# Patient Record
Sex: FEMALE | Race: BLACK OR AFRICAN AMERICAN | URBAN - METROPOLITAN AREA
[De-identification: names, ages, dates, MRNs, and addresses within clinical notes are randomized per-mention and may not be internally consistent; named-entity substitution may affect disease eponyms.]

---

## 2017-02-21 ENCOUNTER — ONCOLOGY VISIT (OUTPATIENT)
Dept: ONCOLOGY | Facility: CLINIC | Age: 37
End: 2017-02-21
Attending: INTERNAL MEDICINE
Payer: COMMERCIAL

## 2017-02-21 VITALS
RESPIRATION RATE: 16 BRPM | DIASTOLIC BLOOD PRESSURE: 86 MMHG | HEART RATE: 96 BPM | OXYGEN SATURATION: 99 % | SYSTOLIC BLOOD PRESSURE: 123 MMHG | TEMPERATURE: 96.7 F | WEIGHT: 137.5 LBS | BODY MASS INDEX: 24.75 KG/M2

## 2017-02-21 DIAGNOSIS — D89.42 IDIOPATHIC MAST CELL ACTIVATION SYNDROME (H): Primary | Chronic | ICD-10-CM

## 2017-02-21 PROCEDURE — 99214 OFFICE O/P EST MOD 30 MIN: CPT | Mod: ZP | Performed by: INTERNAL MEDICINE

## 2017-02-21 PROCEDURE — 99212 OFFICE O/P EST SF 10 MIN: CPT | Mod: ZF

## 2017-02-21 ASSESSMENT — PAIN SCALES - GENERAL: PAINLEVEL: NO PAIN (0)

## 2017-02-21 NOTE — NURSING NOTE
"Yanira Cook is a 36 year old female who presents for:  Chief Complaint   Patient presents with     Oncology Clinic Visit     Return: Mast Cell Eval         Initial Vitals:  /86 (BP Location: Left arm, Patient Position: Chair, Cuff Size: Adult Regular)  Pulse 96  Temp 96.7  F (35.9  C) (Oral)  Resp 16  Wt 62.4 kg (137 lb 8 oz)  SpO2 99%  BMI 24.75 kg/m2 Estimated body mass index is 24.75 kg/(m^2) as calculated from the following:    Height as of 8/16/16: 1.588 m (5' 2.5\").    Weight as of this encounter: 62.4 kg (137 lb 8 oz).. Body surface area is 1.66 meters squared. BP completed using cuff size: regular  No Pain (0) No LMP recorded. Patient is not currently having periods (Reason: UNKNOWN). Allergies and medications reviewed.     Medications: Medication refills not needed today.  Pharmacy name entered into [employer]: [id number] - [place], [place] - [address] AT [facility] OF [facility] [address]    Comments:     6 minutes for nursing intake (face to face time)   Eliana Ayoub CMA        "

## 2017-02-21 NOTE — MR AVS SNAPSHOT
After Visit Summary   2/21/2017    Yanira Cook    MRN: [id number]           Patient Information     Date Of Birth          1980        Visit Information        Provider Department      2/21/2017 4:00 PM Miguel Broussard MD [facility]        Today's Diagnoses     Idiopathic mast cell activation syndrome    -  1       Follow-ups after your visit        Follow-up notes from your care team     Return in about 1 year (around 2/21/2018).      Who to contact     If you have questions or need follow up information about today's clinic visit or your schedule please contact [facility] directly at [phone].  Normal or non-critical lab and imaging results will be communicated to you by MyChart, letter or phone within 4 business days after the clinic has received the results. If you do not hear from us within 7 days, please contact the clinic through [employer] or phone. If you have a critical or abnormal lab result, we will notify you by phone as soon as possible.  Submit refill requests through [employer] or call your pharmacy and they will forward the refill request to us. Please allow 3 business days for your refill to be completed.          Additional Information About Your Visit        MyChart Information     [employer] gives you secure access to your electronic health record. If you see a primary care provider, you can also send messages to your care team and make appointments. If you have questions, please call your primary care clinic.  If you do not have a primary care provider, please call [phone] and they will assist you.        Care EveryWhere ID     This is your Care EveryWhere ID. This could be used by other organizations to access your [facility] medical records  [id number]        Your Vitals Were     Pulse Temperature Respirations Pulse Oximetry BMI (Body Mass Index)       96 96.7  F (35.9  C) (Oral) 16 99% 24.75 kg/m2        Blood Pressure  from Last 3 Encounters:   02/21/17 123/86   08/16/16 (!) 130/93   11/24/15 134/89    Weight from Last 3 Encounters:   02/21/17 62.4 kg (137 lb 8 oz)   08/16/16 64.2 kg (141 lb 8 oz)   11/24/15 64.6 kg (142 lb 6.4 oz)              Today, you had the following     No orders found for display         Today's Medication Changes          These changes are accurate as of: 2/21/17  9:04 PM.  If you have any questions, ask your nurse or doctor.               Stop taking these medicines if you haven't already. Please contact your care team if you have questions.     Acetylcysteine Powd   Stopped by:  Miguel Broussard MD                    Primary Care Provider Office Phone # Fax #    Etienne Pineda [phone]       [facility] [address] [facility] [address]        Thank you!     Thank you for choosing [facility]  for your care. Our goal is always to provide you with excellent care. Hearing back from our patients is one way we can continue to improve our services. Please take a few minutes to complete the written survey that you may receive in the mail after your visit with us. Thank you!             Your Updated Medication List - Protect others around you: Learn how to safely use, store and throw away your medicines at www.disposemymeds.org.          This list is accurate as of: 2/21/17  9:04 PM.  Always use your most recent med list.                   Brand Name Dispense Instructions for use    cetirizine 10 MG tablet    zyrTEC     Take 10 mg by mouth daily       clobetasol 0.05 % cream    TEMOVATE     Apply topically 2 times daily       famotidine 40 MG/5ML suspension    PEPCID     Take 20 mg by mouth 2 times daily       fluocinonide-emollient 0.05 % cream    LIDEX-E     Apply topically 2 times daily       hydrocortisone 1 % ointment      Apply topically as needed       MULTIVITAMINS PO      Take 5 mLs by mouth       PATANOL 0.1 % ophthalmic solution   Generic drug:   olopatadine      1 drop 2 times daily       UNABLE TO FIND      MEDICATION NAME: BACOPA 200 MG DAILY AS DIRECTED       vitamin D3 2000 UNITS Caps      Take 1 capsule by mouth daily 5,000 units

## 2017-02-21 NOTE — LETTER
"2/21/2017       RE: Yanira Cook  [address] [id number]     Dear Colleague,    Thank you for referring your patient, Yanira Cook, to the [facility]. Please see a copy of my visit note below.    Patient: Yanira Cook (MRN [id number])   Encounter Date: Feb 21, 2017  Referring: Martin Pineda M.D. ([facility], [address] [place] [address], [phone], fax [phone])  Attending: Miguel Broussard M.D.     Current Diagnosis: Based on (1) a clinical history highly consistent with chronic/recurrent aberrant mast cell  release, (2) multiple strongly elevated mast cell  levels (an elevated 24-hour urinary prostaglandin D2 in 2/15 (401 ng/24h, normal 100-280), and in 5/15 another elevated 24-hour urinary prostaglandin D2 (959 ng/24h, normal 100-280) and an elevated 24-hour urinary 11-beta-prostaglandin-F2-alpha (2575 ng/24h, normal <= 1000)) (but a normal serum tryptase), (3) absence of any other evident disease that could better account for the full range and chronicity of the symptoms and findings in the case, and (4) at least partial response to mast-cell-targeted therapy, mast cell activation syndrome (MCAS; 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 Cook's essentially lifelong complex of multisystem polymorbidity of a generally inflammatory +/- allergic theme including being informed by her parents that she was born with some sort of a blood infection, and early in life she was noted to frequently suffer ear infections (requiring several myringotomies with tubes) and kidney/bladder infections.  She says she was hospitalized many times in childhood for infection.  She says anemia was diagnosed around the time of menarche at age 9, and she has \"always\" (since menarche) been menorrhagic, which she's always been told is " "the reason for the chronic fatigue that also seemed to first emerge around age 9.  She says she suffered a terrible bout of mononucleosis at age 12 that had her out of school for many months, though she eventually returned to her baseline anemic, chronically fatigued state.  Iron supplements and other unknown interventions were tried for her anemia, but nothing appeared to help.  Given that she is the product of a [place] mother and a [place] father, she says she was tested in some fashion for thalassemia of some sort, but this was found negative.  IV iron was tried at age 16, but it immediately caused dyspnea.  She says she was switched to Venofer and says she did \"OK\" with this, but it only made her anemia and fatigue \"a little bit better\" for about six months.  Sinus surgery of some sort was then pursued for her chronic sinusitis, and this helped for about a year, after which a new issue with chronic/recurrent pharyngitis emerged, leading to a tonsillectomy and adenoidectomy at age 18 that resolved the pharyngitis, but then the sinusitis began recurring again.  (Interestingly, she also says she suffered a cardiac arrest in response to being given Demerol post-op from the [facility].)  Her first pregnancy, at age 19, unfortunately resulted in a live birth with achondrogenesis who  shortly post-partum.  She almost immediately got pregnant (G2) again, gained a severe amount of weight (~50 pounds), and had to be induced at 44 weeks.  She says this is when her subsequently life-long whitfield with eczema began.  Repeat sinus surgery was done at age 19.  She also began noting her hands and feet were always cold and that this was attributed to varicose veins, for which she underwent a stripping procedure even though she was only 21 years old.  The stripping did not help.  Her third pregnancy came at age 21, and she suffered what sounds like hyperemesis gravidarum throughout, delivering at term a boy who initially " "appeared healthy but who now suffers \"idiopathic hypersomnia\" and narcolepsy.  She was also having increasing problems with abdominal pain and underwent at age 21 a cholecystectomy, though a ventral hernia developed from this which had to be repaired in another surgery at age 22, at which point she says she suffered another cardiac arrest, this time from exposure to codeine.  Interestingly, she says that shortly after this repair, she developed \"pigment spots all over,\" though they were non-urticarial.  She says this was diagnosed as Schamberg's disease and spontaneously resolved after about six months.  Coccygitis had emerged by that point and she got cortisone shots for a short period helped.  Meanwhile, she had never lost the weight from the G2 pregnancy, so around age 23 she underwent gastric bypass surgery, losing from ~200 pounds down to ~130 pounds.  She tolerated the surgery OK.  Her fourth and final pregnancy came at age 24, again suffering hyperemesis gravidarum for the duration, to the point of requiring placement of a PICC line with total parenteral nutrition, and she also suffered multiple infections throughout this pregnancy including pertussis and Staph, and as if all of that wasn't enough of an adventure in pregnancy, she also suffered a DVT.  She was induced (\"salvador breech,\" she says) at 34 weeks because it wasn't clear she (let alone the fetus) would survive to term.  Her infant son unfortunately was found to have chondrodysplasia punctata, and though she initially told me he's \"doing OK now,\" it later comes out that \"OK\" means he's chronically on a ventilator.  She recalls she hemorrhaged during this delivery with her hemoglobin nadiring at 2.  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  Interesting, confirmatory " "genetic testing was pursued and was found completely negative.  She began taking prednisone intermittent for her migraines and found this helped for about two years.  At age 27 a new problem with recurrent severe presyncope (and even occasional salvador syncope) emerged.  These idiopathic/spontaneous episodes were accompanied by soaking sweats and flushing.  She failed a tilt-table test and was diagnosed with POTS, but trials of beta blockers, Florinef, and midodrine never helped, and then yet another problem emerged, intermittent severe dystonia, initially just affecting her right arm but over months coming to affect her entire body.  This was diagnosed as a conversion disorder.  She found that when she changed to a different workplace, the frequency and severity of this problem decreased somewhat.  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  Imaging of the cervical spine found some compression in C2-6.  She underwent manual manipulations of her neck for several months, eventually regaining most of the motor and sensory function initially lost in the right arm and leg, but these areas have never come back fully to their prior strength/sensitivity.  Cervical spine cortisone shots have helped, too.  She notes that around age 32 she suffered an acute flare of most of her symptoms (including dystonia) while vacationing in [place].  No diagnosis was made but Ativan helped resolve the dystonia and she returned home (here in [place]).  She has continued to suffer intermittent migraines (prednisone helps), and she feels her bilateral pre- and post-auricular sy areas are constantly waxing/waning in size -- and are constantly tender.  On " "a full ROS at the initial visit here in 2/15, she endorsed a wide range of chronic/recurrent, episodic/intermittent and/or waxing/waning issues including feeling cold much of the time, fatigue (often to the point of exhaustion), malaise, flushing, migraine headaches, diffusely migratory aching/pain, diffusely migratory pruritus, chronic left temple irritation/inflammation (non-specific inflammation on biopsy, not granuloma annulare or inguinale), unprovoked soaking sweats, unprovoked fluctuations in weight and appetite, irritation of the eyes, easy bruising, constant coryza and congestion, mild sensorineural hearing deficit (worse on the right), mouth sores, dyspnea, palpitations, gastroesophageal reflux, nausea, vomiting, diarrhea alternating with constipation (much more so the former), diffusely migratory abdominal discomfort including (usually post-prandial) bloating, urinary frequency, mild chronic/residual right-sided weakness and other episodes of diffusely migratory weakness, edema in the hands and feet on waking in the morning, diffusely migratory tingling/numbness in the hands and feet and random points elsewhere, pre- and post-auricular tender adenopathy (waxing/waning spontaneously), orthostatic and non-orthostatic presyncope, syncope, cognitive dysfunction, hair loss, deterioration of dentition despite good attention to dental hygiene, brittle ridged nails, and poor healing in general.  In the year prior to her initial presentation here in 2/15, she found meditation and a change in diet to \"all organics\" has helped many of her symptoms.  Family history is notable for myasthenia gravis in her father, an older son who is \"allergic to everything\" (and who has found Xolair q 2 weeks really helps this) and has a sleep disorder and G6PD deficiency.  Her younger son, with chondrodysplasia punctata, also has sleep apnea but has found a trach very helpful, and he, too, has G6PD deficiency.  Her daughter is now " "beginning to manifest easy/spontaneous bruising and idiopathic epistaxis and anemia, the oddity of all of which prompted the patient (who had long since given up trying to find an explanation for her many mysteries) to re-attempt finding a unifying diagnosis for her own problems, and this led her to her primary physician and her local hematologist (juan c Serrano (sp.?)?), who she says suggested this might be a mast cell activation syndrome, leading to the present consult.  The patient denies any history of smoking or illegal substance use, alcohol causes her to acutely break out in soaking sweats, soon followed by vomiting.    Current Therapy: She lists her current medications as cetirizine 10 mg bid, famotidine 20 mg bid, vitamin D 5,000 units qd, steroid creams/ointments prn, olopatadine eyedrops bid, probiotic, prednisone prn, acetylcysteine 1 g po qd, and Bacopa 200 mg/d.  She lists her current allergies as anaphylaxis to iron dextran and cardiac arrest with codeine and Demerol.    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-up curiously telling me that she has been feeling well (in contrast to her last report, in which \"allergies\" had resurfaced and she was constantly sniffling throughout the encounter and had a chief complaint of a constant headache since previously stopping vitamin D supplementation) and is very happy with how much she has improved since she first met me and has no complaints (and also mentions she is moving to [place] in late ) -- and yet she also tells me that she doesn't think the vitamin D she restarted, or the increase in cetirizine from 10 mg qd to 10 mg bid in this last interval, have helped her at all.  She denies any new problems.  She reports her son has continued doing much better with his obstructive sleep apnea since undergoing a tracheotomy (CPAP at 20 cm of pressure proved insufficient).  She remains very happy with " "the care being provided her children by Dr. Grande.    Exam finds an obviously improved, happy, outwardly healthy-appearing, pleasant/upbeat, trim mixed-race young woman looking her stated age, in no apparent distress (the prior sniffling is completely resolved), pleasant, cooperative, fully alert and oriented, easily independently ambulatory, presenting with her young tracheotomized son who again is very well behaved throughout the encounter.  Vitals per chart, notable only for an unchanged borderline hypertensive BP at 123/86, pulse 96, weight down 4 since 8/16 to 138 pounds.  [place] findings are her healthy, comfortable general appearance, HEENT benign but for a large number of (unchanged) dental fillings, no plethora/pallor/diaphoresis/jaundice/bleeding/bruising, prior rash now appears resolved, neck supple, no JVD or thyromegaly or carotid bruits, no palpable adenopathy or tenderness at any of the usual node-bearing sites, clear lungs, regular heart with no adventitious sounds, abdomen benign with many well-healed scars and striae (though I think the striae actually have significantly reduced since I last saw them), no peripheral edema, neuro grossly intact.  On a light scratch test on the upper back performed at the initial visit here in 2/15, moderately bright dermatographism (erythroderma only, no hives) almost immediately emerged and was fully sustained when last checked 10 minutes later, but we did not recheck this today.    No new labs today.    A/P: Underlying/unifying diagnosis (MCAS) as detailed above, obviously doing much better today than when I last saw her, but I suspect the improvements from the escalated cetirizine and restarted vitamin D manifested so slowly that it was difficult for her to fully appreciate them.  Since she feels these two medication changes haven't helped her at all, I invited her to \"undo\" them, albeit taking care to make just one change at a time and allow a month after each " "change (stopping the vitamin D, and then decreasing cetirizine to just once daily) to determine whether she's still doing as well (and, if not, to \"undo the undo\").  Finally, she asked for recommendations regarding ongoing sensitivities to odors (such as when shopping), and I recommended she consider a trial of (OTC) Nasalcrom or a nasal H1 antihistamine (e.g., Patanase, Clarispray), and after relocation she can also discuss with her new doctor the possibility of a trial of nebulized cromolyn (20 mg bid-qid).    She'll tentatively return in a year (no labs).      Typed, reviewed, and electronically signed by: Miguel Broussard M.D.     DT: 08/16/2016 10:24 PM    cc: Martin Pineda M.D. ([facility], [address] [place] [address], [phone], fax [phone])    Again, thank you for allowing me to participate in the care of your patient.      Sincerely,    Miguel Broussard MD      "

## 2017-02-21 NOTE — PROGRESS NOTES
"Patient: Yanira Cook (MRN [id number])   Encounter Date: Feb 21, 2017  Referring: Martin Pineda M.D. ([facility], [facility], [address] [place] [address], [phone], fax [phone])  Attending: Miguel Broussard M.D.     Current Diagnosis: Based on (1) a clinical history highly consistent with chronic/recurrent aberrant mast cell  release, (2) multiple strongly elevated mast cell  levels (an elevated 24-hour urinary prostaglandin D2 in 2/15 (401 ng/24h, normal 100-280), and in 5/15 another elevated 24-hour urinary prostaglandin D2 (959 ng/24h, normal 100-280) and an elevated 24-hour urinary 11-beta-prostaglandin-F2-alpha (2575 ng/24h, normal <= 1000)) (but a normal serum tryptase), (3) absence of any other evident disease that could better account for the full range and chronicity of the symptoms and findings in the case, and (4) at least partial response to mast-cell-targeted therapy, mast cell activation syndrome (MCAS; 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 Cook's essentially lifelong complex of multisystem polymorbidity of a generally inflammatory +/- allergic theme including being informed by her parents that she was born with some sort of a blood infection, and early in life she was noted to frequently suffer ear infections (requiring several myringotomies with tubes) and kidney/bladder infections.  She says she was hospitalized many times in childhood for infection.  She says anemia was diagnosed around the time of menarche at age 9, and she has \"always\" (since menarche) been menorrhagic, which she's always been told is the reason for the chronic fatigue that also seemed to first emerge around age 9.  She says she suffered a terrible bout of mononucleosis at age 12 that had her out of school for many months, though she eventually returned to her baseline anemic, chronically " "fatigued state.  Iron supplements and other unknown interventions were tried for her anemia, but nothing appeared to help.  Given that she is the product of a [place] mother and a [place] father, she says she was tested in some fashion for thalassemia of some sort, but this was found negative.  IV iron was tried at age 16, but it immediately caused dyspnea.  She says she was switched to Venofer and says she did \"OK\" with this, but it only made her anemia and fatigue \"a little bit better\" for about six months.  Sinus surgery of some sort was then pursued for her chronic sinusitis, and this helped for about a year, after which a new issue with chronic/recurrent pharyngitis emerged, leading to a tonsillectomy and adenoidectomy at age 18 that resolved the pharyngitis, but then the sinusitis began recurring again.  (Interestingly, she also says she suffered a cardiac arrest in response to being given Demerol post-op from the [facility].)  Her first pregnancy, at age 19, unfortunately resulted in a live birth with achondrogenesis who  shortly post-partum.  She almost immediately got pregnant (G2) again, gained a severe amount of weight (~50 pounds), and had to be induced at 44 weeks.  She says this is when her subsequently life-long whitfield with eczema began.  Repeat sinus surgery was done at age 19.  She also began noting her hands and feet were always cold and that this was attributed to varicose veins, for which she underwent a stripping procedure even though she was only 21 years old.  The stripping did not help.  Her third pregnancy came at age 21, and she suffered what sounds like hyperemesis gravidarum throughout, delivering at term a boy who initially appeared healthy but who now suffers \"idiopathic hypersomnia\" and narcolepsy.  She was also having increasing problems with abdominal pain and underwent at age 21 a cholecystectomy, though a ventral hernia developed from this which had to be repaired in another " "surgery at age 22, at which point she says she suffered another cardiac arrest, this time from exposure to codeine.  Interestingly, she says that shortly after this repair, she developed \"pigment spots all over,\" though they were non-urticarial.  She says this was diagnosed as Schamberg's disease and spontaneously resolved after about six months.  Coccygitis had emerged by that point and she got cortisone shots for a short period helped.  Meanwhile, she had never lost the weight from the G2 pregnancy, so around age 23 she underwent gastric bypass surgery, losing from ~200 pounds down to ~130 pounds.  She tolerated the surgery OK.  Her fourth and final pregnancy came at age 24, again suffering hyperemesis gravidarum for the duration, to the point of requiring placement of a PICC line with total parenteral nutrition, and she also suffered multiple infections throughout this pregnancy including pertussis and Staph, and as if all of that wasn't enough of an adventure in pregnancy, she also suffered a DVT.  She was induced (\"salvador breech,\" she says) at 34 weeks because it wasn't clear she (let alone the fetus) would survive to term.  Her infant son unfortunately was found to have chondrodysplasia punctata, and though she initially told me he's \"doing OK now,\" it later comes out that \"OK\" means he's chronically on a ventilator.  She recalls she hemorrhaged during this delivery with her hemoglobin nadiring at 2.  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  Interesting, confirmatory genetic testing was pursued and was found completely negative.  She began taking prednisone intermittent for her migraines and found this helped for about two years.  At age 27 a new problem with recurrent severe presyncope (and even occasional salvador syncope) emerged. " " These idiopathic/spontaneous episodes were accompanied by soaking sweats and flushing.  She failed a tilt-table test and was diagnosed with POTS, but trials of beta blockers, Florinef, and midodrine never helped, and then yet another problem emerged, intermittent severe dystonia, initially just affecting her right arm but over months coming to affect her entire body.  This was diagnosed as a conversion disorder.  She found that when she changed to a different workplace, the frequency and severity of this problem decreased somewhat.  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  Imaging of the cervical spine found some compression in C2-6.  She underwent manual manipulations of her neck for several months, eventually regaining most of the motor and sensory function initially lost in the right arm and leg, but these areas have never come back fully to their prior strength/sensitivity.  Cervical spine cortisone shots have helped, too.  She notes that around age 32 she suffered an acute flare of most of her symptoms (including dystonia) while vacationing in [place].  No diagnosis was made but Ativan helped resolve the dystonia and she returned home (here in [place]).  She has continued to suffer intermittent migraines (prednisone helps), and she feels her bilateral pre- and post-auricular sy areas are constantly waxing/waning in size -- and are constantly tender.  On a full ROS at the initial visit here in 2/15, she endorsed a wide range of chronic/recurrent, episodic/intermittent and/or waxing/waning issues including feeling cold much of the time, fatigue (often to the point of exhaustion), malaise, flushing, migraine headaches, " "diffusely migratory aching/pain, diffusely migratory pruritus, chronic left temple irritation/inflammation (non-specific inflammation on biopsy, not granuloma annulare or inguinale), unprovoked soaking sweats, unprovoked fluctuations in weight and appetite, irritation of the eyes, easy bruising, constant coryza and congestion, mild sensorineural hearing deficit (worse on the right), mouth sores, dyspnea, palpitations, gastroesophageal reflux, nausea, vomiting, diarrhea alternating with constipation (much more so the former), diffusely migratory abdominal discomfort including (usually post-prandial) bloating, urinary frequency, mild chronic/residual right-sided weakness and other episodes of diffusely migratory weakness, edema in the hands and feet on waking in the morning, diffusely migratory tingling/numbness in the hands and feet and random points elsewhere, pre- and post-auricular tender adenopathy (waxing/waning spontaneously), orthostatic and non-orthostatic presyncope, syncope, cognitive dysfunction, hair loss, deterioration of dentition despite good attention to dental hygiene, brittle ridged nails, and poor healing in general.  In the year prior to her initial presentation here in 2/15, she found meditation and a change in diet to \"all organics\" has helped many of her symptoms.  Family history is notable for myasthenia gravis in her father, an older son who is \"allergic to everything\" (and who has found Xolair q 2 weeks really helps this) and has a sleep disorder and G6PD deficiency.  Her younger son, with chondrodysplasia punctata, also has sleep apnea but has found a trach very helpful, and he, too, has G6PD deficiency.  Her daughter is now beginning to manifest easy/spontaneous bruising and idiopathic epistaxis and anemia, the oddity of all of which prompted the patient (who had long since given up trying to find an explanation for her many mysteries) to re-attempt finding a unifying diagnosis for her own " "problems, and this led her to her primary physician and her local hematologist (a Dr. Serrano (sp.?)?), who she says suggested this might be a mast cell activation syndrome, leading to the present consult.  The patient denies any history of smoking or illegal substance use, alcohol causes her to acutely break out in soaking sweats, soon followed by vomiting.    Current Therapy: She lists her current medications as cetirizine 10 mg bid, famotidine 20 mg bid, vitamin D 5,000 units qd, steroid creams/ointments prn, olopatadine eyedrops bid, probiotic, prednisone prn, acetylcysteine 1 g po qd, and Bacopa 200 mg/d.  She lists her current allergies as anaphylaxis to iron dextran and cardiac arrest with codeine and Demerol.    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-up curiously telling me that she has been feeling well (in contrast to her last report, in which \"allergies\" had resurfaced and she was constantly sniffling throughout the encounter and had a chief complaint of a constant headache since previously stopping vitamin D supplementation) and is very happy with how much she has improved since she first met me and has no complaints (and also mentions she is moving to [place] in late ) -- and yet she also tells me that she doesn't think the vitamin D she restarted, or the increase in cetirizine from 10 mg qd to 10 mg bid in this last interval, have helped her at all.  She denies any new problems.  She reports her son has continued doing much better with his obstructive sleep apnea since undergoing a tracheotomy (CPAP at 20 cm of pressure proved insufficient).  She remains very happy with the care being provided her children by Dr. Grande.    Exam finds an obviously improved, happy, outwardly healthy-appearing, pleasant/upbeat, trim mixed-race young woman looking her stated age, in no apparent distress (the prior sniffling is completely resolved), " "pleasant, cooperative, fully alert and oriented, easily independently ambulatory, presenting with her young tracheotomized son who again is very well behaved throughout the encounter.  Vitals per chart, notable only for an unchanged borderline hypertensive BP at 123/86, pulse 96, weight down 4 since 8/16 to 138 pounds.  [place] findings are her healthy, comfortable general appearance, HEENT benign but for a large number of (unchanged) dental fillings, no plethora/pallor/diaphoresis/jaundice/bleeding/bruising, prior rash now appears resolved, neck supple, no JVD or thyromegaly or carotid bruits, no palpable adenopathy or tenderness at any of the usual node-bearing sites, clear lungs, regular heart with no adventitious sounds, abdomen benign with many well-healed scars and striae (though I think the striae actually have significantly reduced since I last saw them), no peripheral edema, neuro grossly intact.  On a light scratch test on the upper back performed at the initial visit here in 2/15, moderately bright dermatographism (erythroderma only, no hives) almost immediately emerged and was fully sustained when last checked 10 minutes later, but we did not recheck this today.    No new labs today.    A/P: Underlying/unifying diagnosis (MCAS) as detailed above, obviously doing much better today than when I last saw her, but I suspect the improvements from the escalated cetirizine and restarted vitamin D manifested so slowly that it was difficult for her to fully appreciate them.  Since she feels these two medication changes haven't helped her at all, I invited her to \"undo\" them, albeit taking care to make just one change at a time and allow a month after each change (stopping the vitamin D, and then decreasing cetirizine to just once daily) to determine whether she's still doing as well (and, if not, to \"undo the undo\").  Finally, she asked for recommendations regarding ongoing sensitivities to odors (such as when " shopping), and I recommended she consider a trial of (OTC) Nasalcrom or a nasal H1 antihistamine (e.g., Patanase, Clarispray), and after relocation she can also discuss with her new doctor the possibility of a trial of nebulized cromolyn (20 mg bid-qid).    She'll tentatively return in a year (no labs).      Typed, reviewed, and electronically signed by: Miguel Broussard M.D.     DT: 08/16/2016 10:24 PM    cc: Martin Pineda M.D. ([facility], [address] [place] [address] [place] [address], [phone], fax [phone])

## 2018-03-24 ENCOUNTER — HEALTH MAINTENANCE LETTER (OUTPATIENT)
Age: 38
End: 2018-03-24

## 2019-01-14 DIAGNOSIS — J45.50 SEVERE PERSISTENT ASTHMA, UNSPECIFIED WHETHER COMPLICATED (H): ICD-10-CM

## 2019-01-18 PROBLEM — J45.50 SEVERE PERSISTENT ASTHMA (H): Status: ACTIVE | Noted: 2019-01-18

## 2019-09-28 ENCOUNTER — HEALTH MAINTENANCE LETTER (OUTPATIENT)
Age: 39
End: 2019-09-28

## 2021-01-10 ENCOUNTER — HEALTH MAINTENANCE LETTER (OUTPATIENT)
Age: 41
End: 2021-01-10

## 2021-05-25 ENCOUNTER — RECORDS - HEALTHEAST (OUTPATIENT)
Dept: ADMINISTRATIVE | Facility: CLINIC | Age: 41
End: 2021-05-25

## 2021-10-23 ENCOUNTER — HEALTH MAINTENANCE LETTER (OUTPATIENT)
Age: 41
End: 2021-10-23

## 2022-02-12 ENCOUNTER — HEALTH MAINTENANCE LETTER (OUTPATIENT)
Age: 42
End: 2022-02-12

## 2022-10-09 ENCOUNTER — HEALTH MAINTENANCE LETTER (OUTPATIENT)
Age: 42
End: 2022-10-09

## 2023-02-18 ENCOUNTER — HEALTH MAINTENANCE LETTER (OUTPATIENT)
Age: 43
End: 2023-02-18

## 2023-03-25 ENCOUNTER — HEALTH MAINTENANCE LETTER (OUTPATIENT)
Age: 43
End: 2023-03-25

## 2024-03-16 ENCOUNTER — HEALTH MAINTENANCE LETTER (OUTPATIENT)
Age: 44
End: 2024-03-16